# Patient Record
Sex: FEMALE | Race: BLACK OR AFRICAN AMERICAN | NOT HISPANIC OR LATINO | URBAN - METROPOLITAN AREA
[De-identification: names, ages, dates, MRNs, and addresses within clinical notes are randomized per-mention and may not be internally consistent; named-entity substitution may affect disease eponyms.]

---

## 2024-01-23 ENCOUNTER — OFFICE (OUTPATIENT)
Dept: URBAN - METROPOLITAN AREA CLINIC 9 | Facility: CLINIC | Age: 44
End: 2024-01-23

## 2024-01-23 VITALS
SYSTOLIC BLOOD PRESSURE: 128 MMHG | OXYGEN SATURATION: 100 % | WEIGHT: 187 LBS | HEART RATE: 76 BPM | DIASTOLIC BLOOD PRESSURE: 79 MMHG | HEIGHT: 64 IN

## 2024-01-23 DIAGNOSIS — K55.069 ACUTE INFARCTION OF INTESTINE, PART AND EXTENT UNSPECIFIED: ICD-10-CM

## 2024-01-23 DIAGNOSIS — K59.03 DRUG INDUCED CONSTIPATION: ICD-10-CM

## 2024-01-23 PROCEDURE — 99203 OFFICE O/P NEW LOW 30 MIN: CPT

## 2024-01-23 RX ORDER — SODIUM SULFATE, MAGNESIUM SULFATE, AND POTASSIUM CHLORIDE 17.75; 2.7; 2.25 G/1; G/1; G/1
TABLET ORAL
Qty: 24 | Refills: 0 | Status: COMPLETED
Start: 2024-01-23 | End: 2024-02-08

## 2024-02-08 ENCOUNTER — AMBULATORY SURGICAL CENTER (OUTPATIENT)
Dept: URBAN - METROPOLITAN AREA SURGERY 2 | Facility: SURGERY | Age: 44
End: 2024-02-08
Payer: COMMERCIAL

## 2024-02-08 VITALS
OXYGEN SATURATION: 96 % | SYSTOLIC BLOOD PRESSURE: 117 MMHG | OXYGEN SATURATION: 95 % | HEART RATE: 77 BPM | DIASTOLIC BLOOD PRESSURE: 77 MMHG | DIASTOLIC BLOOD PRESSURE: 77 MMHG | WEIGHT: 184 LBS | SYSTOLIC BLOOD PRESSURE: 95 MMHG | OXYGEN SATURATION: 95 % | OXYGEN SATURATION: 100 % | RESPIRATION RATE: 17 BRPM | DIASTOLIC BLOOD PRESSURE: 77 MMHG | HEART RATE: 93 BPM | OXYGEN SATURATION: 97 % | OXYGEN SATURATION: 96 % | OXYGEN SATURATION: 95 % | TEMPERATURE: 97.8 F | HEART RATE: 93 BPM | SYSTOLIC BLOOD PRESSURE: 104 MMHG | OXYGEN SATURATION: 97 % | OXYGEN SATURATION: 97 % | SYSTOLIC BLOOD PRESSURE: 103 MMHG | SYSTOLIC BLOOD PRESSURE: 95 MMHG | DIASTOLIC BLOOD PRESSURE: 78 MMHG | SYSTOLIC BLOOD PRESSURE: 95 MMHG | RESPIRATION RATE: 17 BRPM | HEIGHT: 64 IN | OXYGEN SATURATION: 100 % | DIASTOLIC BLOOD PRESSURE: 63 MMHG | HEART RATE: 89 BPM | SYSTOLIC BLOOD PRESSURE: 103 MMHG | HEART RATE: 80 BPM | TEMPERATURE: 97.8 F | DIASTOLIC BLOOD PRESSURE: 78 MMHG | DIASTOLIC BLOOD PRESSURE: 61 MMHG | DIASTOLIC BLOOD PRESSURE: 78 MMHG | TEMPERATURE: 98.2 F | HEART RATE: 94 BPM | OXYGEN SATURATION: 96 % | DIASTOLIC BLOOD PRESSURE: 63 MMHG | DIASTOLIC BLOOD PRESSURE: 68 MMHG | TEMPERATURE: 98.2 F | SYSTOLIC BLOOD PRESSURE: 103 MMHG | TEMPERATURE: 98.2 F | HEART RATE: 80 BPM | RESPIRATION RATE: 17 BRPM | HEART RATE: 77 BPM | DIASTOLIC BLOOD PRESSURE: 68 MMHG | DIASTOLIC BLOOD PRESSURE: 63 MMHG | RESPIRATION RATE: 18 BRPM | WEIGHT: 184 LBS | HEIGHT: 64 IN | HEART RATE: 77 BPM | RESPIRATION RATE: 18 BRPM | OXYGEN SATURATION: 100 % | HEIGHT: 64 IN | HEART RATE: 89 BPM | SYSTOLIC BLOOD PRESSURE: 117 MMHG | TEMPERATURE: 97.8 F | DIASTOLIC BLOOD PRESSURE: 68 MMHG | HEART RATE: 89 BPM | RESPIRATION RATE: 18 BRPM | DIASTOLIC BLOOD PRESSURE: 61 MMHG | SYSTOLIC BLOOD PRESSURE: 104 MMHG | DIASTOLIC BLOOD PRESSURE: 61 MMHG | SYSTOLIC BLOOD PRESSURE: 117 MMHG | SYSTOLIC BLOOD PRESSURE: 104 MMHG | HEART RATE: 80 BPM | HEART RATE: 94 BPM | HEART RATE: 93 BPM | HEART RATE: 94 BPM | WEIGHT: 184 LBS

## 2024-02-08 DIAGNOSIS — K57.30 DIVERTICULOSIS OF LARGE INTESTINE WITHOUT PERFORATION OR ABS: ICD-10-CM

## 2024-02-08 DIAGNOSIS — Z12.11 ENCOUNTER FOR SCREENING FOR MALIGNANT NEOPLASM OF COLON: ICD-10-CM

## 2024-02-08 DIAGNOSIS — K64.8 OTHER HEMORRHOIDS: ICD-10-CM

## 2024-02-08 PROCEDURE — G0121 COLON CA SCRN NOT HI RSK IND: HCPCS | Performed by: INTERNAL MEDICINE

## 2024-02-08 NOTE — SERVICEHPINOTES
Ms. Osorio is here today for a hospital follow-up from [facility] on 01/13/2024. She states that she started experiencing worsening right-sided abdominal pain causing her to seek the ER for further evaluation. A CT Abdomen was performed on arrival which revealed a right upper quadrant segmental omental infarct. She was administered Dilaudid and Morphine while inpatient and was discharged home with Hydrocodone and Zofran. She states that she took the opioids for approximately 5 days and then stopped due to constipation. She states that the constipation was so severe that she had to do a Fleet enema last week. She has also tried smooth move tea as well as MiraLax with no improvement of symptoms. She was also referred to  Dr. Arsalan Mcmanus for a surgery consultation. The patient states that she was unable to go into the office for the consultation due to inclement weather last week but did receive a call from the physician and was told that the omental infarct does not require surgery and is to be treated conservatively. She is also due for screening colonoscopy (asymptomatic).

## 2024-02-08 NOTE — SERVICEHPINOTES
Ms. Osorio is here today for a hospital follow-up from [facility] on 01/13/2024. She states that she started experiencing worsening right-sided abdominal pain causing her to seek the ER for further evaluation. A CT Abdomen was performed on arrival which revealed a right upper quadrant segmental omental infarct. She was administered Dilaudid and Morphine while inpatient and was discharged home with Hydrocodone and Zofran. She states that she took the opioids for approximately 5 days and then stopped due to constipation. She states that the constipation was so severe that she had to do a Fleet enema last week. She has also tried smooth move tea as well as MiraLax with no improvement of symptoms. She was also referred to  Dr. Arsalan Mcmanus for a surgery consultation. The patient states that she was unable to go into the office for the consultation due to inclement weather last week but did receive a call from the physician and was told that the omental infarct does not require surgery and is to be treated conservatively. She is also due for screening colonoscopy (asymptomatic).

## 2024-02-08 NOTE — SERVICEHPINOTES
Ms. Osorio is here today for a hospital follow-up from [facility] on 01/13/2024. She states that she started experiencing worsening right-sided abdominal pain causing her to seek the ER for further evaluation. A CT Abdomen was performed on arrival which revealed a right upper quadrant segmental omental infarct. She was administered Dilaudid and Morphine while inpatient and was discharged home with Hydrocodone and Zofran. She states that she took the opioids for approximately 5 days and then stopped due to constipation. She states that the constipation was so severe that she had to do a Fleet enema last week. She has also tried smooth move tea as well as MiraLax with no improvement of symptoms. She was also referred to  Dr. Arsalan Mcmanus for a surgery consultation. The patient states that she was unable to go into the office for the consultation due to inclement weather last week but did receive a call from the physician and was told that the omental infarct does not require surgery and is to be treated conservatively. She is also due for screening colonoscopy (asymptomatic).